# Patient Record
Sex: FEMALE | Race: OTHER | Employment: FULL TIME | URBAN - METROPOLITAN AREA
[De-identification: names, ages, dates, MRNs, and addresses within clinical notes are randomized per-mention and may not be internally consistent; named-entity substitution may affect disease eponyms.]

---

## 2017-05-06 ENCOUNTER — OFFICE VISIT (OUTPATIENT)
Dept: ENDOCRINOLOGY CLINIC | Facility: CLINIC | Age: 22
End: 2017-05-06

## 2017-05-06 VITALS
DIASTOLIC BLOOD PRESSURE: 90 MMHG | BODY MASS INDEX: 17.86 KG/M2 | HEART RATE: 130 BPM | HEIGHT: 61 IN | SYSTOLIC BLOOD PRESSURE: 137 MMHG | WEIGHT: 94.63 LBS

## 2017-05-06 DIAGNOSIS — E04.2 MULTIPLE THYROID NODULES: Primary | ICD-10-CM

## 2017-05-06 PROCEDURE — 99212 OFFICE O/P EST SF 10 MIN: CPT | Performed by: INTERNAL MEDICINE

## 2017-05-06 PROCEDURE — 99213 OFFICE O/P EST LOW 20 MIN: CPT | Performed by: INTERNAL MEDICINE

## 2017-05-06 NOTE — PROGRESS NOTES
Return Office Visit     CHIEF COMPLAINT:    Euthyroid MNG. 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   The nodule was noticed by the patient in June 2015 on the ri for: polyuria, polydypsia  Skin: Negative for: rash, blister, cellulitis,       PHYSICAL EXAM:    05/06/17  1138   BP: 137/90   Pulse: 130   Height: 5' 1\" (1.549 m)   Weight: 94 lb 9.6 oz (42.91 kg)     BMI: Body mass index is 17.88 kg/(m^2).      CONSTITU population approx 50-60% of the population  -Discussed risk of malignancy is approx 3-5%, 95-97% of nodules are benign  She had questions regarding surgical removal that were answered.  Discussed that if nodules grow in size/ she feels discomfort, we can co

## 2017-05-08 ENCOUNTER — TELEPHONE (OUTPATIENT)
Dept: ENDOCRINOLOGY CLINIC | Facility: CLINIC | Age: 22
End: 2017-05-08

## 2017-05-08 NOTE — TELEPHONE ENCOUNTER
Orders faxed to the number listed below. Fax confirmation received. Called Doc Astorga Informed her that orders have been faxed.

## 2017-05-09 ENCOUNTER — TELEPHONE (OUTPATIENT)
Dept: ENDOCRINOLOGY CLINIC | Facility: CLINIC | Age: 22
End: 2017-05-09

## 2017-05-09 ENCOUNTER — APPOINTMENT (OUTPATIENT)
Dept: LAB | Facility: HOSPITAL | Age: 22
End: 2017-05-09
Attending: INTERNAL MEDICINE
Payer: COMMERCIAL

## 2017-05-09 DIAGNOSIS — E04.2 MULTIPLE THYROID NODULES: ICD-10-CM

## 2017-05-09 PROCEDURE — 36415 COLL VENOUS BLD VENIPUNCTURE: CPT

## 2017-05-09 PROCEDURE — 84443 ASSAY THYROID STIM HORMONE: CPT

## 2017-05-09 NOTE — TELEPHONE ENCOUNTER
Patient returned call. Informed her of Dr. Maddison Easton message below. She verbalized her understanding.

## 2017-05-11 ENCOUNTER — TELEPHONE (OUTPATIENT)
Dept: ENDOCRINOLOGY CLINIC | Facility: CLINIC | Age: 22
End: 2017-05-11

## 2017-05-11 NOTE — TELEPHONE ENCOUNTER
Thyroid US results received from [facility]. Placed on Dr. Cadence schwartz for review.

## 2017-05-12 NOTE — TELEPHONE ENCOUNTER
Called Darius Donnelly and let her know per [facility] thyroid nodules are stable in size and all less than 1 cm. No further questions at this time.

## 2018-05-18 ENCOUNTER — HOSPITAL ENCOUNTER (EMERGENCY)
Facility: HOSPITAL | Age: 23
Discharge: HOME | End: 2018-05-18
Attending: EMERGENCY MEDICINE
Payer: COMMERCIAL

## 2018-05-18 ENCOUNTER — PRIOR ORIGINAL RECORDS (OUTPATIENT)
Dept: OTHER | Age: 23
End: 2018-05-18

## 2018-05-18 ENCOUNTER — NURSE ONLY (OUTPATIENT)
Dept: CARDIOLOGY CLINIC | Facility: CLINIC | Age: 23
End: 2018-05-18

## 2018-05-18 ENCOUNTER — APPOINTMENT (OUTPATIENT)
Dept: GENERAL RADIOLOGY | Facility: HOSPITAL | Age: 23
End: 2018-05-18
Attending: EMERGENCY MEDICINE
Payer: COMMERCIAL

## 2018-05-18 ENCOUNTER — OFFICE VISIT (OUTPATIENT)
Dept: ENDOCRINOLOGY CLINIC | Facility: CLINIC | Age: 23
End: 2018-05-18

## 2018-05-18 ENCOUNTER — TELEPHONE (OUTPATIENT)
Dept: CARDIOLOGY CLINIC | Facility: CLINIC | Age: 23
End: 2018-05-18

## 2018-05-18 VITALS
BODY MASS INDEX: 19.63 KG/M2 | HEART RATE: 90 BPM | DIASTOLIC BLOOD PRESSURE: 76 MMHG | RESPIRATION RATE: 17 BRPM | SYSTOLIC BLOOD PRESSURE: 121 MMHG | OXYGEN SATURATION: 97 % | WEIGHT: 104 LBS | HEIGHT: 61 IN | TEMPERATURE: 98 F

## 2018-05-18 VITALS
BODY MASS INDEX: 19.63 KG/M2 | HEIGHT: 61 IN | SYSTOLIC BLOOD PRESSURE: 137 MMHG | WEIGHT: 104 LBS | DIASTOLIC BLOOD PRESSURE: 88 MMHG | HEART RATE: 144 BPM

## 2018-05-18 DIAGNOSIS — R00.2 PALPITATIONS: Primary | ICD-10-CM

## 2018-05-18 DIAGNOSIS — E04.1 THYROID NODULE: ICD-10-CM

## 2018-05-18 DIAGNOSIS — Z13.29 THYROID DISORDER SCREENING: Primary | ICD-10-CM

## 2018-05-18 DIAGNOSIS — R00.0 TACHYCARDIA: ICD-10-CM

## 2018-05-18 PROCEDURE — 71045 X-RAY EXAM CHEST 1 VIEW: CPT | Performed by: EMERGENCY MEDICINE

## 2018-05-18 PROCEDURE — 85025 COMPLETE CBC W/AUTO DIFF WBC: CPT | Performed by: EMERGENCY MEDICINE

## 2018-05-18 PROCEDURE — 99285 EMERGENCY DEPT VISIT HI MDM: CPT

## 2018-05-18 PROCEDURE — 84443 ASSAY THYROID STIM HORMONE: CPT | Performed by: EMERGENCY MEDICINE

## 2018-05-18 PROCEDURE — 99213 OFFICE O/P EST LOW 20 MIN: CPT | Performed by: INTERNAL MEDICINE

## 2018-05-18 PROCEDURE — 93005 ELECTROCARDIOGRAM TRACING: CPT

## 2018-05-18 PROCEDURE — 36415 COLL VENOUS BLD VENIPUNCTURE: CPT

## 2018-05-18 PROCEDURE — 99212 OFFICE O/P EST SF 10 MIN: CPT | Performed by: INTERNAL MEDICINE

## 2018-05-18 PROCEDURE — 93010 ELECTROCARDIOGRAM REPORT: CPT | Performed by: EMERGENCY MEDICINE

## 2018-05-18 PROCEDURE — 80048 BASIC METABOLIC PNL TOTAL CA: CPT | Performed by: EMERGENCY MEDICINE

## 2018-05-18 NOTE — ED INITIAL ASSESSMENT (HPI)
Pt sent to ED by pcp after pt's HR was noted to be elevated during a follow up appointment for cysts on thyroid.  Pt denies any cp, reports mild sob at rest.

## 2018-05-18 NOTE — ED PROVIDER NOTES
Patient Seen in: [facility] AND [facility] Emergency Department    History   Patient presents with:  Arrythmia/Palpitations (cardiovascular)    Stated Complaint: tachy    HPI    52-year-old female with history of hypertension and thyroid mass presents from her e normal  Neurological: Speech normal.  Moving extremities equally ×4. Skin: warm and dry, no rashes.   Musculoskeletal: neck is supple non tender        Extremities are symmetrical, full range of motion  Psychiatric: patient is oriented X 3, there is no deanna diagnosis)    Disposition:  Discharge  5/18/2018  1:50 pm    Follow-up:  Breezy Lutz MD  [address]  [address]  [address] [facility] [phone]

## 2018-05-18 NOTE — PROGRESS NOTES
Return Office Visit     CHIEF COMPLAINT:    Euthyroid MNG. 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   The nodule was noticed by the patient in June 2015 on the ri blister, cellulitis,       PHYSICAL EXAM:    05/18/18  1133   BP: 137/88   BP Location: Right arm   Patient Position: Sitting   Cuff Size: adult   Pulse: (!) 144   Weight: 104 lb (47.2 kg)   Height: 5' 1\" (1.549 m)     BMI: Body mass index is 19.65 kg/m². malignancy is approx 3-5%, 95-97% of nodules are benign    PLAN:  - Thyroid US  - Call if she has any compressive symptoms. Noted that patient was tachycardic ( ). Repeat was 97. Noted that her PA was high on last visit also ( a year ago).    She i

## 2018-05-18 NOTE — CONSULTS
[facility]    Cardiology Consultation  [facility]    Fly Nieto Patient Status:  Emergency    1995 MRN [id number]   Location [address] Attending Isak Ornelas MD   [facility] Day # %.  Intake/Output:   Last 3 shifts: [id number]   This shift: No intake/output data recorded.      Vent Settings:      Hemodynamic parameters (last 24 hours):      Scheduled Meds:     Continuous Infusions:       Physical Exam:    General: Alert and orien arrhythmia she will need to decrease her caffeine ingestion    Thank you for allowing me to participate in the care of your patient.     Regine Vega  5/18/2018

## 2018-05-18 NOTE — ED NOTES
Discharge instructions and prescription for halter monitor given to pt. Pt verbalized understanding of home care, medication use, and to follow up with cardiology. Pt denied further questions or concerns.  Pt ambulatory out of ED, discharged in stable condi

## 2018-05-21 ENCOUNTER — TELEPHONE (OUTPATIENT)
Dept: ENDOCRINOLOGY CLINIC | Facility: CLINIC | Age: 23
End: 2018-05-21

## 2018-05-21 NOTE — TELEPHONE ENCOUNTER
Pt requesting Ultrasound Order to be faxed: Attn:[facility] - Fax:#[phone]. Pls call pt once faxed  at:[phone],thanks.

## 2018-05-21 NOTE — TELEPHONE ENCOUNTER
Spoke to patient and informed her that she needs to call [facility] to activate & baseline event monitor. Also informed her that she will need to call insurance access [employer] to see if Dr Nimo Cheema is in net work to see patient for f/u.  Patient verbalized unde

## 2018-05-21 NOTE — TELEPHONE ENCOUNTER
Patient was placed on event monitor 5-18-18 and should have called and sent baseline to [facility]  Left voice message to inform patient to call [employer] asap to be registered for event monitor

## 2018-05-29 ENCOUNTER — TELEPHONE (OUTPATIENT)
Dept: ENDOCRINOLOGY CLINIC | Facility: CLINIC | Age: 23
End: 2018-05-29

## 2018-05-29 DIAGNOSIS — E04.2 MULTIPLE THYROID NODULES: Primary | ICD-10-CM

## 2018-05-29 NOTE — TELEPHONE ENCOUNTER
Reviewed  Thyroid US   stable nodules. 2 6 mm nodules on right and one 1 cm nodule on right,   Thanks.

## 2018-05-30 NOTE — TELEPHONE ENCOUNTER
Called Kosta Mak. Informed her of Dr. Familia Avila message below. She has an appt scheduled on Friday. Should she keep appt? Also does she need to repeat any imaging or labs?

## 2018-05-30 NOTE — TELEPHONE ENCOUNTER
Kenny MCLEAN Informed her of Dr. Joann Dumont message below. She states that she saw her PCP for HR and is currently wearing a heart monitor for 1 month and will go back for evaluation. Ordered repeat US and mailed to the patient today.

## 2018-06-20 ENCOUNTER — MED REC SCAN ONLY (OUTPATIENT)
Dept: CARDIOLOGY CLINIC | Facility: CLINIC | Age: 23
End: 2018-06-20

## 2018-06-25 ENCOUNTER — TELEPHONE (OUTPATIENT)
Dept: CARDIOLOGY CLINIC | Facility: CLINIC | Age: 23
End: 2018-06-25

## 2018-06-25 ENCOUNTER — PRIOR ORIGINAL RECORDS (OUTPATIENT)
Dept: OTHER | Age: 23
End: 2018-06-25

## 2018-06-25 PROCEDURE — 93272 ECG/REVIEW INTERPRET ONLY: CPT | Performed by: INTERNAL MEDICINE

## 2018-06-25 NOTE — TELEPHONE ENCOUNTER
Event monitor results scanned and ready to be read by Dr Norberto Hitchcock.  Message sent to Dr Norberto Hitchcock

## 2018-06-27 LAB
BUN: 12 MG/DL
CALCIUM: 9.3 MG/DL
CHLORIDE: 105 MEQ/L
CREATININE, SERUM: 0.54 MG/DL
GLUCOSE: 119 MG/DL
GLUCOSE: 119 MG/DL
HEMATOCRIT: 43.8 %
HEMOGLOBIN: 15 G/DL
PLATELETS: 210 K/UL
POTASSIUM, SERUM: 3.5 MEQ/L
RED BLOOD COUNT: 4.65 X 10-6/U
SODIUM: 138 MEQ/L
THYROID STIMULATING HORMONE: 1.07 MLU/L
WHITE BLOOD COUNT: 3.9 X 10-3/U

## 2018-07-12 ENCOUNTER — PRIOR ORIGINAL RECORDS (OUTPATIENT)
Dept: OTHER | Age: 23
End: 2018-07-12

## 2018-07-12 ENCOUNTER — MYAURORA ACCOUNT LINK (OUTPATIENT)
Dept: OTHER | Age: 23
End: 2018-07-12

## 2019-02-28 VITALS
HEIGHT: 61 IN | HEART RATE: 129 BPM | DIASTOLIC BLOOD PRESSURE: 82 MMHG | SYSTOLIC BLOOD PRESSURE: 140 MMHG | WEIGHT: 103 LBS | BODY MASS INDEX: 19.45 KG/M2 | OXYGEN SATURATION: 99 %

## 2019-09-13 ENCOUNTER — TELEPHONE (OUTPATIENT)
Dept: ENDOCRINOLOGY CLINIC | Facility: CLINIC | Age: 24
End: 2019-09-13

## 2019-09-13 NOTE — TELEPHONE ENCOUNTER
Pt indicates she noticed last night a larger bump forming rt side neck. Pt has not yet scheduled US, pls call at:[phone],thanks.

## 2019-09-13 NOTE — TELEPHONE ENCOUNTER
Pt c/o lump in same location as biopsy scar but further right. RN advised to schedule US since it is due soon. RN also advised pt to see PCP to rule out other issue such as enlarged lumph node.  Pt verbalized understanding and will let us know once US has b

## 2019-09-17 ENCOUNTER — APPOINTMENT (OUTPATIENT)
Dept: LAB | Facility: HOSPITAL | Age: 24
End: 2019-09-17
Attending: INTERNAL MEDICINE
Payer: COMMERCIAL

## 2019-09-17 ENCOUNTER — OFFICE VISIT (OUTPATIENT)
Dept: ENDOCRINOLOGY CLINIC | Facility: CLINIC | Age: 24
End: 2019-09-17
Payer: COMMERCIAL

## 2019-09-17 VITALS
HEART RATE: 92 BPM | WEIGHT: 108.63 LBS | BODY MASS INDEX: 21 KG/M2 | SYSTOLIC BLOOD PRESSURE: 135 MMHG | DIASTOLIC BLOOD PRESSURE: 81 MMHG

## 2019-09-17 DIAGNOSIS — E04.1 THYROID NODULE: ICD-10-CM

## 2019-09-17 DIAGNOSIS — Z13.29 THYROID DISORDER SCREENING: Primary | ICD-10-CM

## 2019-09-17 DIAGNOSIS — Z13.29 THYROID DISORDER SCREENING: ICD-10-CM

## 2019-09-17 LAB — TSI SER-ACNC: 0.55 MIU/ML (ref 0.36–3.74)

## 2019-09-17 PROCEDURE — 99213 OFFICE O/P EST LOW 20 MIN: CPT | Performed by: INTERNAL MEDICINE

## 2019-09-17 PROCEDURE — 36415 COLL VENOUS BLD VENIPUNCTURE: CPT

## 2019-09-17 PROCEDURE — 84443 ASSAY THYROID STIM HORMONE: CPT

## 2019-09-17 NOTE — PROGRESS NOTES
Return Office Visit     CHIEF COMPLAINT:    Euthyroid MNG. 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   The nodule was noticed by the patient in June 2015 on the ri PHYSICAL EXAM:    09/17/19  1108   BP: 135/81   Pulse: 92   Weight: 108 lb 9.6 oz (49.3 kg)     BMI: Body mass index is 20.52 kg/m².      CONSTITUTIONAL:  awake, alert, cooperative, no apparent distress, and appears stated age  PSYCH: normal affect  E

## 2019-09-18 ENCOUNTER — TELEPHONE (OUTPATIENT)
Dept: ENDOCRINOLOGY CLINIC | Facility: CLINIC | Age: 24
End: 2019-09-18

## 2019-09-18 DIAGNOSIS — E04.2 MULTIPLE THYROID NODULES: Primary | ICD-10-CM

## 2019-09-24 NOTE — TELEPHONE ENCOUNTER
Pt calling for lab results.  Pt also inquiring if office received US THYROID results from [facility]. Please call [phone]

## 2019-09-25 NOTE — TELEPHONE ENCOUNTER
Pt notified with understanding. Order  Placed and hard copy mailed to pt. She will keep clinic apprised of her progress.

## 2019-09-25 NOTE — TELEPHONE ENCOUNTER
Reviewed thyroid US results. She has many nodules in the thyroid which she has from before. One of the right sided nodule has slightly increased in size. However, it is not very big.    If she does not have any problems in breathing or swallowing, I sugge

## 2019-12-27 ENCOUNTER — TELEPHONE (OUTPATIENT)
Dept: SCHEDULING | Age: 24
End: 2019-12-27

## 2020-02-27 ENCOUNTER — TELEPHONE (OUTPATIENT)
Dept: ENDOCRINOLOGY CLINIC | Facility: CLINIC | Age: 25
End: 2020-02-27

## 2020-02-27 DIAGNOSIS — E04.2 MULTIPLE THYROID NODULES: Primary | ICD-10-CM

## 2020-02-27 NOTE — TELEPHONE ENCOUNTER
Problem: Patient Goal: Rt Focus  Goal: 1. Patient Goal: RT Focus  Nebs given as ordered-pt. instructed in use of inhaler with spacer BS- dim with slight exp. wheeze         Pt requesting to have order for US faxed to [facility]  at [phone]

## 2020-02-28 NOTE — TELEPHONE ENCOUNTER
Patient had us done in September 2019 and per encounter 9/18/19  Repeat US in 6-9 months . Ordered per protocol.   Faxed over     [facility]  at [phone]

## 2020-03-12 ENCOUNTER — TELEPHONE (OUTPATIENT)
Dept: ENDOCRINOLOGY CLINIC | Facility: CLINIC | Age: 25
End: 2020-03-12

## 2020-03-12 NOTE — TELEPHONE ENCOUNTER
Patient calling to inform she completed Ultra Sound at [facility] on 3/6/2020, patient calling for results, please call at:[phone],thanks.

## 2020-03-12 NOTE — TELEPHONE ENCOUNTER
Called [facility] [place] [place] and requested result .    Waiting for report

## 2020-03-12 NOTE — TELEPHONE ENCOUNTER
Thyroid US report received, placed on Dr. Harsha Maddox desk. Left voicemail to patient to inform her that Dr. Kelsy Easton is out of town x 2 weeks. Routing to on call provider to possibly look at report. Thank you.

## 2020-03-13 NOTE — TELEPHONE ENCOUNTER
Dr Bipin Menendez - please advise US results on your desk. RN advised pt ok to wait until Dr Bipin Menendez returns to get results - nothing emergent. Pt verbalized understanding. RN mailed US results to her as well. Pt booked apt 5/22/20 for yearly apt.

## 2020-03-24 NOTE — TELEPHONE ENCOUNTER
Nodules are stable in size.  One nodule is under 1 cm, the other nodule is 1 x 0.9 x 0.7 cm , down from 1.3 x 1x 0.8 cm    Thanks

## 2020-03-25 NOTE — TELEPHONE ENCOUNTER
Patient would like to speak directly to Dr. Susy Garcia and get a better understanding of nodule that is getting smaller in size. Patient states she sometimes has a sharp pain coming from nodule.

## 2020-04-07 NOTE — TELEPHONE ENCOUNTER
Spoke with patient, agreeable to video visit. Visit booked for Friday. Instructed patient to download [facility] rober to iPhone. She is aware that appt will be billed as a visit.

## 2020-04-10 ENCOUNTER — TELEPHONE (OUTPATIENT)
Dept: ENDOCRINOLOGY CLINIC | Facility: CLINIC | Age: 25
End: 2020-04-10

## 2020-04-10 ENCOUNTER — TELEMEDICINE (OUTPATIENT)
Dept: ENDOCRINOLOGY CLINIC | Facility: CLINIC | Age: 25
End: 2020-04-10
Payer: COMMERCIAL

## 2020-04-10 DIAGNOSIS — E04.1 THYROID NODULE: Primary | ICD-10-CM

## 2020-04-10 PROCEDURE — 99213 OFFICE O/P EST LOW 20 MIN: CPT | Performed by: INTERNAL MEDICINE

## 2020-04-10 NOTE — PROGRESS NOTES
Return Office Visit     CHIEF COMPLAINT:    Euthyroid MNG.      This visit is conducted using Telemedicine with live, interactive video and audio    HISTORY OF PRESENT ILLNESS:  Nakul Roach is a 30year old female who presents for follow up for for evaluat frequency  Psychiatric: Negative for:  depression, anxiety  Hematology/Lymphatics: Negative for: bruising, lower extremity edema  Endocrine: Negative for: polyuria, polydypsia  Skin: Negative for: rash, blister, cellulitis,       LIMITED PHYSICAL EXAM:   C

## 2020-05-21 ENCOUNTER — TELEPHONE (OUTPATIENT)
Dept: ENDOCRINOLOGY CLINIC | Facility: CLINIC | Age: 25
End: 2020-05-21

## 2020-05-21 DIAGNOSIS — E04.1 THYROID NODULE: Primary | ICD-10-CM

## 2020-05-21 NOTE — TELEPHONE ENCOUNTER
Spoke with the patient and informed her to perform thyroid US in December of 2020. She requested a new order with new expected date. Order placed and mailed to her.

## 2020-05-21 NOTE — TELEPHONE ENCOUNTER
Patient states that she already completed a ultrasound in March. Patient states that she received another order for a ultrasound in the mail that was to be done by April 21st 2020.  Patient is inquiring if she needs to take another ultrasound this soon or i

## 2020-05-21 NOTE — TELEPHONE ENCOUNTER
Andreas Franco MD      4/10/20 1:33 PM   Note      Please mail order for thyroid US to be done in Dec 2020  Thanks        Left message to call back. The ultrasound order she received is for her to do in December this year.

## 2020-11-10 ENCOUNTER — TELEPHONE (OUTPATIENT)
Dept: ENDOCRINOLOGY CLINIC | Facility: CLINIC | Age: 25
End: 2020-11-10

## 2020-11-10 NOTE — TELEPHONE ENCOUNTER
Patient requesting US orders to be faxed [facility]. For additional questions please call patient. Thank you.

## 2020-11-27 NOTE — TELEPHONE ENCOUNTER
Right thyroid nodules are stable in size.    If she has compressive symptom, please call  Otherwise, suggest repeating US with PCP every 12-18 month to monitor for stability  Thanks

## 2020-11-27 NOTE — TELEPHONE ENCOUNTER
Received Thyroid ultrasound result from [facility] done on 11/24/20. Emailed securely to Dr. Kenneth Robert as she is virtual the next couple of days.

## 2020-11-30 NOTE — TELEPHONE ENCOUNTER
rn called patient and gave her message by dr Bud Sneed and provided her with phone number to ent dr Kvng Wilson

## 2020-11-30 NOTE — TELEPHONE ENCOUNTER
Spoke with Kadie Shafer MD message below. Patient states she did experience odynophagia (painful swallowing) with food, and was relieved by Aleve. Currently not experiencing any compressive symptoms at this time.  She will report if those symptoms wor

## 2021-11-30 ENCOUNTER — TELEPHONE (OUTPATIENT)
Dept: ENDOCRINOLOGY CLINIC | Facility: CLINIC | Age: 26
End: 2021-11-30

## 2021-11-30 DIAGNOSIS — E04.1 THYROID NODULE: Primary | ICD-10-CM

## 2021-12-01 NOTE — TELEPHONE ENCOUNTER
Spoke to patient and relayed below message. RN scheduled follow up visit as below. She will call the office if her ultrasound is scheduled after the appointment.     Future Appointments   Date Time Provider Carolyn Brito   1/4/2022  9:45 AM WINSOME Cash

## 2021-12-01 NOTE — TELEPHONE ENCOUNTER
An order thyroid US diagnosis : thyroid nodule  I will review results at apt  Can book visit in 4-5 weeks  Okay to overbook if needed  Thanks

## 2021-12-01 NOTE — TELEPHONE ENCOUNTER
lov 4/10/20  Us order   Pt doesn't have appointment yet    rn called patient , asking for us order  Fax [phone] rose marie Branch No do you want to order us and squeeze in after she does us?please advise

## 2022-03-04 ENCOUNTER — TELEPHONE (OUTPATIENT)
Dept: ENDOCRINOLOGY CLINIC | Facility: CLINIC | Age: 27
End: 2022-03-04

## 2022-03-04 NOTE — TELEPHONE ENCOUNTER
Received fax from [facility]. Attached is thyroid ultrasound results. Placed on provider desk for review.

## 2022-03-11 ENCOUNTER — TELEPHONE (OUTPATIENT)
Dept: ENDOCRINOLOGY CLINIC | Facility: CLINIC | Age: 27
End: 2022-03-11

## 2022-03-11 NOTE — TELEPHONE ENCOUNTER
Received records but patient ha snot been seen since 2020  Please call and book apt first available/ sooner if symptomatic  Thanks

## 2022-03-15 NOTE — TELEPHONE ENCOUNTER
Spoke with patient and scheduled appt for 5/2/22. She will get a referral from her PCP and bring updated insurance information.

## 2022-05-02 ENCOUNTER — OFFICE VISIT (OUTPATIENT)
Dept: ENDOCRINOLOGY CLINIC | Facility: CLINIC | Age: 27
End: 2022-05-02
Payer: COMMERCIAL

## 2022-05-02 VITALS
BODY MASS INDEX: 23 KG/M2 | WEIGHT: 122 LBS | HEART RATE: 90 BPM | SYSTOLIC BLOOD PRESSURE: 139 MMHG | DIASTOLIC BLOOD PRESSURE: 86 MMHG

## 2022-05-02 DIAGNOSIS — E04.1 THYROID NODULE: Primary | ICD-10-CM

## 2023-11-21 ENCOUNTER — OFFICE VISIT (OUTPATIENT)
Dept: ENDOCRINOLOGY CLINIC | Facility: CLINIC | Age: 28
End: 2023-11-21
Payer: COMMERCIAL

## 2023-11-21 VITALS
HEART RATE: 86 BPM | BODY MASS INDEX: 23 KG/M2 | SYSTOLIC BLOOD PRESSURE: 129 MMHG | WEIGHT: 123 LBS | DIASTOLIC BLOOD PRESSURE: 72 MMHG

## 2023-11-21 DIAGNOSIS — E04.1 THYROID NODULE: Primary | ICD-10-CM

## 2023-11-21 PROCEDURE — 3078F DIAST BP <80 MM HG: CPT | Performed by: INTERNAL MEDICINE

## 2023-11-21 PROCEDURE — 3074F SYST BP LT 130 MM HG: CPT | Performed by: INTERNAL MEDICINE

## 2023-11-21 PROCEDURE — 99213 OFFICE O/P EST LOW 20 MIN: CPT | Performed by: INTERNAL MEDICINE

## 2023-11-21 NOTE — PROGRESS NOTES
Return Office Visit     CHIEF COMPLAINT:    Euthyroid MNG. 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 The nodule was noticed by the patient in June 2015 on the right side of her neck. It was painless. She underwent a thyroid US at [facility] that showed a 2.5 x 1.7 x 2.3 cm complex cystic mass and a 1.2 x 0.7 x 1 cm complex partly cystic mass. She underwent FNA at [facility] on two occasions. The first FNA was unsatisfactory. Second FNA showed a benign nodule. Fluid was drained each time and the mass shrunk. Denies any problems in breathing/swallowing. No FH of thyroid cancer. No h/o radiation exposure. Thyroid us 2020  Nodules are stable in size. One nodule is under 1 cm, the other nodule is 1 x 0.9 x 0.7 cm , down from 1.3 x 1x 0.8 cm    Thyroid US 2022:  RIGHT:   *  Nodule Number 1: There is a 0.6 x 0.3 x 0.6 cm nodule (if seen previously:  0.6 x 0.3 x 0.6 cm) located in the mid thyroid. Composition: Solid or almost completely solid (2). Echogenicity: Hyperechoic (1). Shape: Wider than tall (0). Margins: Smooth (0). Echogenic foci: None (0). TR 3 (3 points)   *  Nodule Number 2: There is a 1.0 x 0.9 x 0.9 cm nodule (if seen previously:  0.9 x 0.8 x 0.8 cm) located in the lower thyroid. Composition: Mixed cystic and solid (1). Echogenicity: Hypoechoic (2). Shape: Wider than tall (0). Margins: Smooth (0). Echogenic foci: None (0). TR 3 (3 points)        THYOID US 8/2023:   RIGHT:   * Nodule Number 1: There is a 0.5 x 0.3 x 0.5 cm nodule (if seen previously:  0.6 x 0.3 x 0.6 cm) located in the mid thyroid. Composition: Solid or almost completely solid (2). Echogenicity: Hyperechoic (1). Shape: Wider than tall (0). Margins: Ill-defined (0). Echogenic foci: None (0).   TR 3 (3 points)   *  Nodule Number 2: There is a 0.7 x 0.7 x 0.8 cm nodule (if seen previously:  1.0 x 0.9 x 0.9 cm) located in the mid thyroid. Composition: Solid or almost completely solid (2). Echogenicity: Hypoechoic (2). Shape: Wider than tall (0). Margins: Smooth (0). Echogenic foci: None (0). TR 4 (4-6 points)   *  Nodule Number 3: There is a 0.3 x 0.3 x 0.3 cm nodule (if seen previously:  Not previously visualized) located in the lower thyroid. Composition: Solid or almost completely solid (2). Echogenicity: Hypoechoic (2). Shape: Wider than tall (0). Margins: Smooth (0). Echogenic foci: None (0). TR 4 (4-6 points)       CURRENT MEDICATION:    No current outpatient medications on file. ALLERGY:  No Known Allergies    PAST MEDICAL, SOCIAL AND FAMILY HISTORY:  See past medical history marked as reviewed. See past surgical history marked as reviewed. See past family history marked as reviewed. See past social history marked as reviewed. ASSESSMENTS:       REVIEW OF SYSTEMS:  Constitutional: Negative for: weight change, fever, fatigue, cold/heat intolerance  Eyes: Negative for:  Visual changes, proptosis, blurring  ENT: Negative for:  dysphagia, neck swelling, dysphonia  Respiratory: Negative for:  dyspnea, cough  Cardiovascular: Negative for:  chest pain, palpitations, orthopnea  GI: Negative for:  abdominal pain, nausea, vomiting, diarrhea, constipation, bleeding  Neurology: Negative for: headache, numbness, weakness  Genito-Urinary: Negative for: dysuria, frequency  Psychiatric: Negative for:  depression, anxiety  Hematology/Lymphatics: Negative for: bruising, lower extremity edema  Endocrine: Negative for: polyuria, polydypsia  Skin: Negative for: rash, blister, cellulitis,        PHYSICAL EXAM:     Vitals reviewed        General Appearance:  alert, well developed, in no acute distress  Head: Atraumatic  Eyes:  normal conjunctivae, sclera. , normal sclera and normal pupils  Throat/Neck: normal sound to voice.  Normal hearing, normal speech, thyroid not enlarged, non tender  Respiratory:  Speaking in full sentences, non-labored. no increased work of breathing, no audible wheezing    Neuro: motor grossly intact, moving all extremities without difficulty  Psychiatric:  oriented to time, self, and place  Extremities: no obvious extremity swelling, no lesions          DATA:     Pertinent data reviewed    ASSESSMENT AND PLAN:    25year old female with euthyroid MNG  Nodule/s has/ve been benign on FNA. They have been stable on thyroid US. There is a new right sided nodule that is very small , Tr 4-6   No RF for thyroid cancer on history. TSH normal in Aug 2023    PLAN:  - Discussed that these can be monitored by her PC every 15-18 months, Interval can be increased in the future if these continue to be stable   - We discussed surgery as an option if she develops compressive symptoms or nodules grow in size. - Call right away if she has any compressive symptoms. Patient verbalized a complete  understanding of all of the above and did not have any further questions.                   Madeline Mariano MD

## 2024-06-05 ENCOUNTER — TELEPHONE (OUTPATIENT)
Dept: ENDOCRINOLOGY CLINIC | Facility: CLINIC | Age: 29
End: 2024-06-05

## 2024-06-05 ENCOUNTER — LAB ENCOUNTER (OUTPATIENT)
Dept: LAB | Age: 29
End: 2024-06-05
Attending: INTERNAL MEDICINE
Payer: MEDICAID

## 2024-06-05 DIAGNOSIS — E04.1 THYROID NODULE: ICD-10-CM

## 2024-06-05 DIAGNOSIS — E04.1 THYROID NODULE: Primary | ICD-10-CM

## 2024-06-05 LAB
T3FREE SERPL-MCNC: 3.37 PG/ML (ref 2.4–4.2)
T4 FREE SERPL-MCNC: 1.1 NG/DL (ref 0.8–1.7)
TSI SER-ACNC: 0.3 MIU/ML (ref 0.55–4.78)

## 2024-06-05 PROCEDURE — 84439 ASSAY OF FREE THYROXINE: CPT

## 2024-06-05 PROCEDURE — 36415 COLL VENOUS BLD VENIPUNCTURE: CPT

## 2024-06-05 PROCEDURE — 84481 FREE ASSAY (FT-3): CPT

## 2024-06-05 PROCEDURE — 84443 ASSAY THYROID STIM HORMONE: CPT

## 2024-06-05 NOTE — TELEPHONE ENCOUNTER
Received a referral on [facility] from Maria Isabel Villalta MD   It seems like patient is pregnant?   Please call and check   If pregnant, please book VV on Monday   Can please order TSH with reflex  Diagnosis thyroid nodule   Can please be done before apt     Thanks

## 2024-06-05 NOTE — TELEPHONE ENCOUNTER
Confirmed with PSR that insurance is accepted. [place]. Chart has been updated with new insurance. Patient notified.

## 2024-06-05 NOTE — TELEPHONE ENCOUNTER
Lab order placed.     Called the patient. Confirmed she is pregnant. Offered and patient accepted appt this coming Monday at 12:15pm for video visit. Aware to do lab work prior to appt. Discussed instructions for [employer] video appt.     Patient states that she no longer has insurance coverage with access dupage. Now has medicaid. However she is unsure of the type of medicaid plan or if we take her new insurance. Her new member number (RIN) is: [id number]. Will confirm with PSRs if her insurance is taken.

## 2024-06-10 ENCOUNTER — TELEMEDICINE (OUTPATIENT)
Dept: ENDOCRINOLOGY CLINIC | Facility: CLINIC | Age: 29
End: 2024-06-10
Payer: MEDICAID

## 2024-06-10 DIAGNOSIS — E04.1 THYROID NODULE: Primary | ICD-10-CM

## 2024-06-10 DIAGNOSIS — E07.9 THYROID DISEASE AFFECTING PREGNANCY (HCC): ICD-10-CM

## 2024-06-10 DIAGNOSIS — O99.280 THYROID DISEASE AFFECTING PREGNANCY (HCC): ICD-10-CM

## 2024-06-10 NOTE — PROGRESS NOTES
John Noland verbally consents to a video visit on 6/10/2024     Patient understands and accepts financial responsibility for any deductible, co-insurance and/or co-pays associated with this service.  Patient has been referred to the [facility] website at [email or web address] to review the yearly Consent to Treat document.        This visit is conducted using Telemedicine with live, interactive video and audio  Return Office Visit     CHIEF COMPLAINT:    Euthyroid MNG.         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  The nodule was noticed by the patient in June 2015 on the right side of her neck. It was painless. She underwent a thyroid US at [facility] that showed a 2.5 x 1.7 x 2.3 cm complex cystic mass and a 1.2 x 0.7 x 1 cm complex partly cystic mass.  She underwent FNA at [facility] on two occasions. The first FNA was unsatisfactory. Second FNA showed a benign nodule. Fluid was drained each time and the mass shrunk.      Denies any problems in breathing/swallowing.  No FH of thyroid cancer.  No h/o radiation exposure.     Thyroid us 2020  Nodules are stable in size. One nodule is under 1 cm, the other nodule is 1 x 0.9 x 0.7 cm , down from 1.3 x 1x 0.8 cm    Thyroid US 2022:  RIGHT:   *  Nodule Number 1: There is a 0.6 x 0.3 x 0.6 cm nodule (if seen previously:  0.6 x 0.3 x 0.6 cm) located in the mid thyroid. Composition: Solid or almost completely solid (2).  Echogenicity: Hyperechoic (1). Shape: Wider than tall (0). Margins: Smooth (0). Echogenic foci: None (0).  TR 3 (3 points)   *  Nodule Number 2: There is a 1.0 x 0.9 x 0.9 cm nodule (if seen previously:  0.9 x 0.8 x 0.8 cm) located in the lower thyroid. Composition: Mixed cystic and solid (1).  Echogenicity: Hypoechoic (2). Shape: Wider than tall (0). Margins: Smooth (0). Echogenic foci: None (0).  TR 3 (3 points)        THYOID US 8/2023:   RIGHT:   * Nodule  Number 1: There is a 0.5 x 0.3 x 0.5 cm nodule (if seen previously:  0.6 x 0.3 x 0.6 cm) located in the mid thyroid. Composition: Solid or almost completely solid (2).  Echogenicity: Hyperechoic (1). Shape: Wider than tall (0). Margins: Ill-defined (0). Echogenic foci: None (0).  TR 3 (3 points)   *  Nodule Number 2: There is a 0.7 x 0.7 x 0.8 cm nodule (if seen previously:  1.0 x 0.9 x 0.9 cm) located in the mid thyroid. Composition: Solid or almost completely solid (2).  Echogenicity: Hypoechoic (2). Shape: Wider than tall (0). Margins: Smooth (0). Echogenic foci: None (0).  TR 4 (4-6 points)   *  Nodule Number 3: There is a 0.3 x 0.3 x 0.3 cm nodule (if seen previously:  Not previously visualized) located in the lower thyroid. Composition: Solid or almost completely solid (2).  Echogenicity: Hypoechoic (2). Shape: Wider than tall (0). Margins: Smooth (0). Echogenic foci: None (0).  TR 4 (4-6 points)       She is doing well  She is about 7-8 weeks pregnant   Doing well but reports some nausea, no vomiting     CURRENT MEDICATION:    No current outpatient medications on file.         ALLERGY:  No Known Allergies    PAST MEDICAL, SOCIAL AND FAMILY HISTORY:  See past medical history marked as reviewed.  See past surgical history marked as reviewed.  See past family history marked as reviewed.  See past social history marked as reviewed.    ASSESSMENTS:       REVIEW OF SYSTEMS:  Constitutional: Negative for: weight change, fever, fatigue, cold/heat intolerance  Eyes: Negative for:  Visual changes, proptosis, blurring  ENT: Negative for:  dysphagia, neck swelling, dysphonia  Respiratory: Negative for:  dyspnea, cough  Cardiovascular: Negative for:  chest pain, palpitations, orthopnea  GI: Negative for:  abdominal pain, + intermittent nausea, vomiting, diarrhea, constipation, bleeding  Neurology: Negative for: headache, numbness, weakness  Genito-Urinary: Negative for: dysuria, frequency  Psychiatric: Negative for:   depression, anxiety  Hematology/Lymphatics: Negative for: bruising, lower extremity edema  Endocrine: Negative for: polyuria, polydypsia  Skin: Negative for: rash, blister, cellulitis,        PHYSICAL EXAM:     Vitals reviewed        General Appearance:  alert, well developed, in no acute distress  Head: Atraumatic  Eyes:  normal conjunctivae, sclera., normal sclera and normal pupils  Throat/Neck: normal sound to voice. Normal hearing, normal speech, thyroid not enlarged, non tender  Respiratory:  Speaking in full sentences, non-labored. no increased work of breathing, no audible wheezing    Neuro: motor grossly intact, moving all extremities without difficulty  Psychiatric:  oriented to time, self, and place  Extremities: no obvious extremity swelling, no lesions          DATA:     Pertinent data reviewed    ASSESSMENT AND PLAN:    28 year old female with euthyroid MNG  Nodule/s has/ve been benign on FNA. They have been stable on thyroid US. There is a new right sided nodule that is very small , Tr 4-6   No RF for thyroid cancer on history.  TSH normal in Aug 2023 slightly low on recent check . Free T4 and Free T3 normal.   Clinically , no specific symptoms of hyperthyroidism  She is about 7-8 weeks pregnant with some nausea  Discussed that low TSH could be related to hcg elevation in pregnancy     PLAN:  - recheck labs in 4 weeks  -To call if she develops any symptoms of over active thyroid that were reviewed with etelvina today   - Thyroid US in Aug 2024, to call if she develops any compressive symptoms    Patient verbalized a complete  understanding of all of the above and did not have any further questions.                  Makayla aCsh MD        RTC as needed based on results          Please note that the following visit was completed using two-way, real-time interactive audio and video communication.  This has been done in good caitlin to provide continuity of care in the best interest of the provider-patient  relationship.  There are limitations of this visit as no physical exam could be performed.  Every conscious effort was taken to allow for sufficient and adequate time.  This billing was spent on reviewing labs, medications, radiology tests and decision making.  Appropriate medical decision-making and tests are ordered as detailed in the plan of care above.”

## 2024-09-03 NOTE — TELEPHONE ENCOUNTER
Since everything is stable, no apt needed. She can get a repeat us in 18 months and FU with me/ pcp at that time  Call if she develops compressive symptoms. Did she FU with pcp for her HR? Thanks. See MD note

## (undated) NOTE — ED AVS SNAPSHOT
Анна Harrison   MRN: [id number]    Department:  [facility] Emergency Department   Date of Visit:  5/18/2018           Disclosure     Insurance plans vary and the physician(s) referred by the ER may not be covered by your plan.  Please contact you CARE PHYSICIAN AT ONCE OR RETURN IMMEDIATELY TO THE EMERGENCY DEPARTMENT. If you have been prescribed any medication(s), please fill your prescription right away and begin taking the medication(s) as directed.   If you believe that any of the medications

## (undated) NOTE — MR AVS SNAPSHOT
[address]  [phone]               Thank you for choosing us for your health care visit with Leny Rojo MD.  We are glad to serve you and happy to provide you with this summary of your visit. Reason for Today's Visit     Thyroid Problem           Medical Issues Discussed Today     Multiple thyroid nodules    -  Primary      Instructions and Information about Your Health     None      Allergies as of May 06, 2017     No Known Allergies Aerobic physical activity Regular aerobic physical activity (e.g., brisk walking, light jogging, cycling, swimming, etc.) for a goal of at least 150 minutes per week. Moderation of alcohol consumption Men: limit to <= 2 drinks* per day.   Women and lighte